# Patient Record
Sex: FEMALE | Race: BLACK OR AFRICAN AMERICAN | NOT HISPANIC OR LATINO
[De-identification: names, ages, dates, MRNs, and addresses within clinical notes are randomized per-mention and may not be internally consistent; named-entity substitution may affect disease eponyms.]

---

## 2023-03-21 PROBLEM — Z00.00 ENCOUNTER FOR PREVENTIVE HEALTH EXAMINATION: Status: ACTIVE | Noted: 2023-03-21

## 2023-04-04 ENCOUNTER — APPOINTMENT (OUTPATIENT)
Dept: OTOLARYNGOLOGY | Facility: CLINIC | Age: 63
End: 2023-04-04
Payer: MEDICARE

## 2023-04-04 VITALS
OXYGEN SATURATION: 98 % | BODY MASS INDEX: 27.29 KG/M2 | SYSTOLIC BLOOD PRESSURE: 115 MMHG | TEMPERATURE: 98.1 F | DIASTOLIC BLOOD PRESSURE: 73 MMHG | WEIGHT: 154 LBS | HEIGHT: 63 IN | HEART RATE: 78 BPM

## 2023-04-04 DIAGNOSIS — H91.90 UNSPECIFIED HEARING LOSS, UNSPECIFIED EAR: ICD-10-CM

## 2023-04-04 DIAGNOSIS — K21.9 GASTRO-ESOPHAGEAL REFLUX DISEASE W/OUT ESOPHAGITIS: ICD-10-CM

## 2023-04-04 DIAGNOSIS — R49.0 DYSPHONIA: ICD-10-CM

## 2023-04-04 DIAGNOSIS — M26.609 UNSPECIFIED TEMPOROMANDIBULAR JOINT DISORDER: ICD-10-CM

## 2023-04-04 PROCEDURE — 99205 OFFICE O/P NEW HI 60 MIN: CPT | Mod: 25

## 2023-04-04 PROCEDURE — 31579 LARYNGOSCOPY TELESCOPIC: CPT

## 2023-04-04 NOTE — PROCEDURE
[de-identified] : -\par Procedure Note\par \par Pre-operative Diagnosis: Dysphonia, Throat discomfort\par Post-operative Diagnosis:  laryngopharyngeal reflux,  additive mass lesion right mid fold of unknown significance\par Anesthesia: Topical - 1 % Lidocaine/Phenylephrine\par Procedure:  Flexible Laryngoscopy with Stroboscopy - [facility] [id number]\par  \par Procedure Details:  \par The patient was placed in the sitting position.  After decongestant and anesthesia were applied the laryngoscope was passed.  The nasal cavities, nasopharynx, oropharynx, hypopharynx, and larynx were all examined.  Vocal folds were examined during respiration and phonation.  The following findings were noted:\par \par Findings:  \par Nose: Septum is midline, turbinates are normal, nasal airways patent, mucosa normal\par Nasopharynx: Adenoids normal, no masses, eustachian tube normal\par Oropharynx: Pharyngeal walls symmetric and without lesion. Tonsils/fossae symmetric\par Hypopharynx: Hypopharynx and pyriform sinuses without lesion. No masses or asymmetry.  No pooling of secretions.\par Larynx:  Epiglottis and aryepiglottic folds were sharp and crisp bilaterally.  Bilateral false vocal folds normal appearance. Airway was widely patent.  + postcricoid edema,  erythema of the vocal process\par \par Strobe Exam Ratings\par 		\par TVF Appearance: normal,  mild erythema of the vocal process,  additive mass lesion on the left mid fold of unknown significance\par TVF Mobility: normal\par Edema/hypertrophy: normal,  + postcricoid edema\par Mucus on TVF: normal\par Glottic Closure: normal/adequate\par Mucosal Wave:  reduced\par Amplitude of Vibration:  reduced\par Phase: symmetric\par Supraglottic Hyperfunction:  +\par Other Findings: none\par \par Condition: Stable.  Patient tolerated procedure well.\par \par Complications: None\par \par --------------------------------------------------------------------

## 2023-04-04 NOTE — HISTORY OF PRESENT ILLNESS
[de-identified] : 4/4/23\par 61 yo yo female who presents with a "water sound" in her ears.  This is bilateral.  This has been present for years.  This is happening all of the time.  she feels that there might be some actual drainage and crusting as well.  Children notice a decrease in hearing.  No vertiginous symptoms.  She does not some pain in her ears and just anterior, when opening her jaw.\par \par No issues chewing, eating or swallowing.  No issues breathing issues.  No ENT issues otherwise.  notes some dysphonia and hoarseness, especially at end of sentences, sometimes will lose voice\par \par Diet:\par + coffee, mint, citrus, garlic, onion,  \par blueberries, tomatoe, carbonated beverages\par Drinks 6 glasses of water daily\par +late night eating.

## 2023-04-04 NOTE — ASSESSMENT
[FreeTextEntry1] : 62-year-old female with multiple ENT issues.\par \par   First in terms of her ears they appear clean today.  In terms of sensation of hearing water moving I suspect that this is likely tinnitus.  I am recommending audiogram and tympanogram for further evaluation.  Patient will have this completed and follow-up after to review those results.\par \par   In terms of ear/jaw discomfort especially when opening her jaw patient does have dentures in place.  I suspect that there is some level of TMJ.  I am recommending follow-up with an oral surgeon for further evaluation and management.\par \par   In terms of dysphonia and throat related issues there is evidence of LPR.  I am recommending dietary and behavioral modification reduce acid in the diet.  She will follow-up in 3 months and should symptoms persist can consider starting an antireflux medication or consultation with GI.  In terms of her voice LPR could be playing a role but there is also evidence of an additive mass lesion of the right mid fold of undetermined significance.  I have recommended speech pathology however the patient would like to defer for now.\par \par –Audiogram and tympanogram\par – Follow-up with oral surgery for TMJ, TMJ handout given\par –LPR handout and voice hygiene handout given\par – Dietary behavioral modification reduce acid in the diet\par – Consultation with speech pathology, patient deferred\par – Follow-up 1 month to  review the above\par –if symptoms persist can consider antireflux medications as well as consultation with GI

## 2023-04-04 NOTE — PHYSICAL EXAM
[FreeTextEntry1] :  hoarse voice, decreased range, decreased pitch control and clarity especially at the end of her sentences [Midline] : trachea located in midline position [Normal] : no rashes

## 2023-04-10 ENCOUNTER — APPOINTMENT (OUTPATIENT)
Dept: OTOLARYNGOLOGY | Facility: CLINIC | Age: 63
End: 2023-04-10
Payer: MEDICARE

## 2023-04-10 PROCEDURE — 92550 TYMPANOMETRY & REFLEX THRESH: CPT | Mod: 52

## 2023-04-10 PROCEDURE — 92557 COMPREHENSIVE HEARING TEST: CPT

## 2023-04-25 ENCOUNTER — APPOINTMENT (OUTPATIENT)
Dept: OTOLARYNGOLOGY | Facility: CLINIC | Age: 63
End: 2023-04-25

## 2023-04-25 DIAGNOSIS — H90.3 SENSORINEURAL HEARING LOSS, BILATERAL: ICD-10-CM

## 2023-04-25 DIAGNOSIS — H93.13 TINNITUS, BILATERAL: ICD-10-CM
